# Patient Record
Sex: MALE | Race: WHITE | Employment: OTHER | URBAN - METROPOLITAN AREA
[De-identification: names, ages, dates, MRNs, and addresses within clinical notes are randomized per-mention and may not be internally consistent; named-entity substitution may affect disease eponyms.]

---

## 2017-01-19 PROCEDURE — 82043 UR ALBUMIN QUANTITATIVE: CPT | Performed by: FAMILY MEDICINE

## 2017-01-19 PROCEDURE — 82570 ASSAY OF URINE CREATININE: CPT | Performed by: FAMILY MEDICINE

## 2018-01-19 PROCEDURE — 82043 UR ALBUMIN QUANTITATIVE: CPT | Performed by: FAMILY MEDICINE

## 2018-01-19 PROCEDURE — 82570 ASSAY OF URINE CREATININE: CPT | Performed by: FAMILY MEDICINE

## 2018-01-19 PROCEDURE — 86803 HEPATITIS C AB TEST: CPT | Performed by: FAMILY MEDICINE

## 2018-08-20 PROBLEM — R91.8 PULMONARY NODULES: Status: ACTIVE | Noted: 2018-08-20

## 2019-08-23 PROCEDURE — 87186 SC STD MICRODIL/AGAR DIL: CPT | Performed by: FAMILY MEDICINE

## 2019-08-23 PROCEDURE — 87077 CULTURE AEROBIC IDENTIFY: CPT | Performed by: FAMILY MEDICINE

## 2019-08-23 PROCEDURE — 87205 SMEAR GRAM STAIN: CPT | Performed by: FAMILY MEDICINE

## 2019-08-23 PROCEDURE — 87070 CULTURE OTHR SPECIMN AEROBIC: CPT | Performed by: FAMILY MEDICINE

## 2021-01-01 ENCOUNTER — APPOINTMENT (OUTPATIENT)
Dept: CV DIAGNOSTICS | Facility: HOSPITAL | Age: 66
DRG: 280 | End: 2021-01-01
Attending: INTERNAL MEDICINE
Payer: COMMERCIAL

## 2021-01-01 ENCOUNTER — HOSPITAL ENCOUNTER (INPATIENT)
Facility: HOSPITAL | Age: 66
LOS: 2 days | Discharge: HOME | DRG: 280 | End: 2021-01-01
Attending: EMERGENCY MEDICINE | Admitting: INTERNAL MEDICINE
Payer: COMMERCIAL

## 2021-01-01 ENCOUNTER — APPOINTMENT (OUTPATIENT)
Dept: CT IMAGING | Facility: HOSPITAL | Age: 66
DRG: 280 | End: 2021-01-01
Attending: EMERGENCY MEDICINE
Payer: COMMERCIAL

## 2021-01-01 ENCOUNTER — APPOINTMENT (OUTPATIENT)
Dept: INTERVENTIONAL RADIOLOGY/VASCULAR | Facility: HOSPITAL | Age: 66
DRG: 280 | End: 2021-01-01
Attending: INTERNAL MEDICINE
Payer: COMMERCIAL

## 2021-01-01 ENCOUNTER — APPOINTMENT (OUTPATIENT)
Dept: GENERAL RADIOLOGY | Facility: HOSPITAL | Age: 66
DRG: 280 | End: 2021-01-01
Attending: EMERGENCY MEDICINE
Payer: COMMERCIAL

## 2021-01-01 ENCOUNTER — APPOINTMENT (OUTPATIENT)
Dept: GENERAL RADIOLOGY | Facility: HOSPITAL | Age: 66
DRG: 280 | End: 2021-01-01
Attending: INTERNAL MEDICINE
Payer: COMMERCIAL

## 2021-01-01 VITALS
HEIGHT: 71 IN | SYSTOLIC BLOOD PRESSURE: 110 MMHG | HEART RATE: 89 BPM | DIASTOLIC BLOOD PRESSURE: 79 MMHG | TEMPERATURE: 98 F | RESPIRATION RATE: 18 BRPM | BODY MASS INDEX: 17.64 KG/M2 | WEIGHT: 126 LBS | OXYGEN SATURATION: 97 %

## 2021-01-01 DIAGNOSIS — I50.9 ACUTE CONGESTIVE HEART FAILURE, UNSPECIFIED HEART FAILURE TYPE (HCC): Primary | ICD-10-CM

## 2021-01-01 DIAGNOSIS — I20.0 UNSTABLE ANGINA (HCC): ICD-10-CM

## 2021-01-01 DIAGNOSIS — E78.5 HYPERLIPIDEMIA, UNSPECIFIED HYPERLIPIDEMIA TYPE: ICD-10-CM

## 2021-01-01 DIAGNOSIS — R07.9 ACUTE CHEST PAIN: ICD-10-CM

## 2021-01-01 PROCEDURE — 85730 THROMBOPLASTIN TIME PARTIAL: CPT | Performed by: PHYSICIAN ASSISTANT

## 2021-01-01 PROCEDURE — 85379 FIBRIN DEGRADATION QUANT: CPT | Performed by: EMERGENCY MEDICINE

## 2021-01-01 PROCEDURE — 85027 COMPLETE CBC AUTOMATED: CPT | Performed by: INTERNAL MEDICINE

## 2021-01-01 PROCEDURE — 85025 COMPLETE CBC W/AUTO DIFF WBC: CPT | Performed by: EMERGENCY MEDICINE

## 2021-01-01 PROCEDURE — 71045 X-RAY EXAM CHEST 1 VIEW: CPT | Performed by: INTERNAL MEDICINE

## 2021-01-01 PROCEDURE — 93306 TTE W/DOPPLER COMPLETE: CPT | Performed by: INTERNAL MEDICINE

## 2021-01-01 PROCEDURE — 85027 COMPLETE CBC AUTOMATED: CPT | Performed by: PHYSICIAN ASSISTANT

## 2021-01-01 PROCEDURE — 82962 GLUCOSE BLOOD TEST: CPT

## 2021-01-01 PROCEDURE — 99152 MOD SED SAME PHYS/QHP 5/>YRS: CPT

## 2021-01-01 PROCEDURE — 84484 ASSAY OF TROPONIN QUANT: CPT | Performed by: EMERGENCY MEDICINE

## 2021-01-01 PROCEDURE — 85347 COAGULATION TIME ACTIVATED: CPT

## 2021-01-01 PROCEDURE — 99285 EMERGENCY DEPT VISIT HI MDM: CPT

## 2021-01-01 PROCEDURE — 93458 L HRT ARTERY/VENTRICLE ANGIO: CPT

## 2021-01-01 PROCEDURE — 93010 ELECTROCARDIOGRAM REPORT: CPT

## 2021-01-01 PROCEDURE — 83880 ASSAY OF NATRIURETIC PEPTIDE: CPT | Performed by: EMERGENCY MEDICINE

## 2021-01-01 PROCEDURE — 85730 THROMBOPLASTIN TIME PARTIAL: CPT | Performed by: INTERNAL MEDICINE

## 2021-01-01 PROCEDURE — 84484 ASSAY OF TROPONIN QUANT: CPT | Performed by: INTERNAL MEDICINE

## 2021-01-01 PROCEDURE — 99153 MOD SED SAME PHYS/QHP EA: CPT

## 2021-01-01 PROCEDURE — B2151ZZ FLUOROSCOPY OF LEFT HEART USING LOW OSMOLAR CONTRAST: ICD-10-PCS | Performed by: INTERNAL MEDICINE

## 2021-01-01 PROCEDURE — 71045 X-RAY EXAM CHEST 1 VIEW: CPT | Performed by: EMERGENCY MEDICINE

## 2021-01-01 PROCEDURE — B2111ZZ FLUOROSCOPY OF MULTIPLE CORONARY ARTERIES USING LOW OSMOLAR CONTRAST: ICD-10-PCS | Performed by: INTERNAL MEDICINE

## 2021-01-01 PROCEDURE — 4A023N7 MEASUREMENT OF CARDIAC SAMPLING AND PRESSURE, LEFT HEART, PERCUTANEOUS APPROACH: ICD-10-PCS | Performed by: INTERNAL MEDICINE

## 2021-01-01 PROCEDURE — 84484 ASSAY OF TROPONIN QUANT: CPT | Performed by: PHYSICIAN ASSISTANT

## 2021-01-01 PROCEDURE — 87493 C DIFF AMPLIFIED PROBE: CPT | Performed by: HOSPITALIST

## 2021-01-01 PROCEDURE — 85049 AUTOMATED PLATELET COUNT: CPT | Performed by: PHYSICIAN ASSISTANT

## 2021-01-01 PROCEDURE — 94640 AIRWAY INHALATION TREATMENT: CPT

## 2021-01-01 PROCEDURE — 96374 THER/PROPH/DIAG INJ IV PUSH: CPT

## 2021-01-01 PROCEDURE — 71260 CT THORAX DX C+: CPT | Performed by: EMERGENCY MEDICINE

## 2021-01-01 PROCEDURE — 93005 ELECTROCARDIOGRAM TRACING: CPT

## 2021-01-01 PROCEDURE — 80053 COMPREHEN METABOLIC PANEL: CPT | Performed by: INTERNAL MEDICINE

## 2021-01-01 PROCEDURE — 80053 COMPREHEN METABOLIC PANEL: CPT | Performed by: EMERGENCY MEDICINE

## 2021-01-01 RX ORDER — CLOPIDOGREL BISULFATE 75 MG/1
75 TABLET ORAL DAILY
Status: DISCONTINUED | OUTPATIENT
Start: 2021-01-01 | End: 2021-01-01

## 2021-01-01 RX ORDER — HEPARIN SODIUM AND DEXTROSE 10000; 5 [USP'U]/100ML; G/100ML
12 INJECTION INTRAVENOUS ONCE
Status: COMPLETED | OUTPATIENT
Start: 2021-01-01 | End: 2021-01-01

## 2021-01-01 RX ORDER — ALBUTEROL SULFATE 90 UG/1
2 AEROSOL, METERED RESPIRATORY (INHALATION) EVERY 6 HOURS PRN
Status: DISCONTINUED | OUTPATIENT
Start: 2021-01-01 | End: 2021-01-01

## 2021-01-01 RX ORDER — TRAZODONE HYDROCHLORIDE 50 MG/1
50 TABLET ORAL NIGHTLY
Status: DISCONTINUED | OUTPATIENT
Start: 2021-01-01 | End: 2021-01-01

## 2021-01-01 RX ORDER — METOPROLOL SUCCINATE 25 MG/1
25 TABLET, EXTENDED RELEASE ORAL
Qty: 90 TABLET | Refills: 3 | Status: SHIPPED | OUTPATIENT
Start: 2021-01-01

## 2021-01-01 RX ORDER — ASPIRIN 81 MG/1
81 TABLET ORAL DAILY
Qty: 90 TABLET | Refills: 3 | Status: SHIPPED | OUTPATIENT
Start: 2021-01-01

## 2021-01-01 RX ORDER — HEPARIN SODIUM AND DEXTROSE 10000; 5 [USP'U]/100ML; G/100ML
INJECTION INTRAVENOUS CONTINUOUS
Status: DISCONTINUED | OUTPATIENT
Start: 2021-01-01 | End: 2021-01-01

## 2021-01-01 RX ORDER — METOPROLOL TARTRATE 5 MG/5ML
INJECTION INTRAVENOUS
Status: COMPLETED
Start: 2021-01-01 | End: 2021-01-01

## 2021-01-01 RX ORDER — PANTOPRAZOLE SODIUM 40 MG/1
40 TABLET, DELAYED RELEASE ORAL
Status: DISCONTINUED | OUTPATIENT
Start: 2021-01-01 | End: 2021-01-01

## 2021-01-01 RX ORDER — ATORVASTATIN CALCIUM 40 MG/1
40 TABLET, FILM COATED ORAL DAILY
Qty: 90 TABLET | Refills: 3 | Status: SHIPPED | OUTPATIENT
Start: 2021-01-01 | End: 2021-01-01

## 2021-01-01 RX ORDER — FUROSEMIDE 10 MG/ML
40 INJECTION INTRAMUSCULAR; INTRAVENOUS ONCE
Status: COMPLETED | OUTPATIENT
Start: 2021-01-01 | End: 2021-01-01

## 2021-01-01 RX ORDER — SODIUM CHLORIDE 9 MG/ML
INJECTION, SOLUTION INTRAVENOUS CONTINUOUS
Status: ACTIVE | OUTPATIENT
Start: 2021-01-01 | End: 2021-01-01

## 2021-01-01 RX ORDER — NITROGLYCERIN 20 MG/100ML
INJECTION INTRAVENOUS
Status: COMPLETED
Start: 2021-01-01 | End: 2021-01-01

## 2021-01-01 RX ORDER — MIDAZOLAM HYDROCHLORIDE 1 MG/ML
INJECTION INTRAMUSCULAR; INTRAVENOUS
Status: COMPLETED
Start: 2021-01-01 | End: 2021-01-01

## 2021-01-01 RX ORDER — ISOSORBIDE MONONITRATE 30 MG/1
30 TABLET, EXTENDED RELEASE ORAL DAILY
Status: DISCONTINUED | OUTPATIENT
Start: 2021-01-01 | End: 2021-01-01

## 2021-01-01 RX ORDER — VERAPAMIL HYDROCHLORIDE 2.5 MG/ML
INJECTION, SOLUTION INTRAVENOUS
Status: COMPLETED
Start: 2021-01-01 | End: 2021-01-01

## 2021-01-01 RX ORDER — ISOSORBIDE MONONITRATE 30 MG/1
30 TABLET, EXTENDED RELEASE ORAL DAILY
Qty: 90 TABLET | Refills: 3 | Status: SHIPPED | OUTPATIENT
Start: 2021-01-01

## 2021-01-01 RX ORDER — LOPERAMIDE HYDROCHLORIDE 2 MG/1
2 CAPSULE ORAL
Status: DISCONTINUED | OUTPATIENT
Start: 2021-01-01 | End: 2021-01-01

## 2021-01-01 RX ORDER — DEXTROSE MONOHYDRATE 25 G/50ML
50 INJECTION, SOLUTION INTRAVENOUS
Status: DISCONTINUED | OUTPATIENT
Start: 2021-01-01 | End: 2021-01-01

## 2021-01-01 RX ORDER — ATORVASTATIN CALCIUM 20 MG/1
20 TABLET, FILM COATED ORAL NIGHTLY
Status: DISCONTINUED | OUTPATIENT
Start: 2021-01-01 | End: 2021-01-01

## 2021-01-01 RX ORDER — LOPERAMIDE HYDROCHLORIDE 2 MG/1
2 CAPSULE ORAL 4 TIMES DAILY PRN
Qty: 30 CAPSULE | Refills: 0 | Status: SHIPPED | OUTPATIENT
Start: 2021-01-01

## 2021-01-01 RX ORDER — LIDOCAINE HYDROCHLORIDE 10 MG/ML
INJECTION, SOLUTION EPIDURAL; INFILTRATION; INTRACAUDAL; PERINEURAL
Status: COMPLETED
Start: 2021-01-01 | End: 2021-01-01

## 2021-01-01 RX ORDER — HEPARIN SODIUM 5000 [USP'U]/ML
INJECTION, SOLUTION INTRAVENOUS; SUBCUTANEOUS
Status: COMPLETED
Start: 2021-01-01 | End: 2021-01-01

## 2021-01-01 RX ORDER — ATORVASTATIN CALCIUM 20 MG/1
20 TABLET, FILM COATED ORAL NIGHTLY
Status: SHIPPED | COMMUNITY
Start: 2021-01-01

## 2021-01-01 RX ORDER — ALBUTEROL SULFATE 90 UG/1
2 AEROSOL, METERED RESPIRATORY (INHALATION) EVERY 6 HOURS PRN
Qty: 1 EACH | Refills: 0 | Status: SHIPPED | OUTPATIENT
Start: 2021-01-01 | End: 2021-01-01

## 2021-01-01 RX ORDER — BUPROPION HYDROCHLORIDE 150 MG/1
150 TABLET, EXTENDED RELEASE ORAL 2 TIMES DAILY
Status: DISCONTINUED | OUTPATIENT
Start: 2021-01-01 | End: 2021-01-01

## 2021-01-01 RX ORDER — ASPIRIN 81 MG/1
81 TABLET ORAL DAILY
Status: DISCONTINUED | OUTPATIENT
Start: 2021-01-01 | End: 2021-01-01

## 2021-01-01 RX ORDER — METOPROLOL SUCCINATE 25 MG/1
25 TABLET, EXTENDED RELEASE ORAL
Status: DISCONTINUED | OUTPATIENT
Start: 2021-01-01 | End: 2021-01-01

## 2021-08-28 PROBLEM — C78.02 MALIGNANT NEOPLASM METASTATIC TO LEFT LUNG (HCC): Status: ACTIVE | Noted: 2021-01-01

## 2021-08-28 PROBLEM — C78.01 MALIGNANT NEOPLASM METASTATIC TO RIGHT LUNG (HCC): Status: ACTIVE | Noted: 2021-01-01

## 2021-08-28 PROBLEM — C78.6 METASTASIS TO PERITONEAL CAVITY (HCC): Status: ACTIVE | Noted: 2021-01-01

## 2021-08-28 PROBLEM — C78.7 LIVER METASTASIS (HCC): Status: ACTIVE | Noted: 2021-01-01

## 2021-08-28 PROBLEM — D50.0 IRON DEFICIENCY ANEMIA DUE TO CHRONIC BLOOD LOSS: Status: ACTIVE | Noted: 2021-01-01

## 2021-08-28 PROBLEM — C18.7 MALIGNANT NEOPLASM OF SIGMOID COLON (HCC): Status: ACTIVE | Noted: 2021-01-01

## 2021-10-20 PROBLEM — I50.9 ACUTE CONGESTIVE HEART FAILURE, UNSPECIFIED HEART FAILURE TYPE (HCC): Status: ACTIVE | Noted: 2021-01-01

## 2021-10-20 PROBLEM — I50.9 ACUTE CONGESTIVE HEART FAILURE (HCC): Status: ACTIVE | Noted: 2021-01-01

## 2021-10-20 PROBLEM — R07.9 ACUTE CHEST PAIN: Status: ACTIVE | Noted: 2021-01-01

## 2021-10-20 NOTE — ED PROVIDER NOTES
Patient Seen in: [facility] Emergency Department      History   Patient presents with:  Chest Pain Angina    Stated Complaint: CP    Subjective:   e          Patient 66-year-old male with history of coronary disease, metastatic colon cancer undergoi Years: 35.00        Pack years: 28        Types: Cigarettes      Smokeless tobacco: Never Used    Alcohol use: No      Alcohol/week: 0.0 standard drinks    Drug use:  No             Review of Systems    Positive for stated complaint: CP  Other systems are a ED Course     Labs Reviewed   COMP METABOLIC PANEL (14) - Abnormal; Notable for the following components:       Result Value    Glucose 138 (*)     BUN 36 (*)     Calcium, Total 6.6 (*)     Calculated Osmolality 299 (*)     AST 98 (*)     Ene myself. -Imaging studies were ordered and reviewed by myself.   -If available, previous medical record was reviewed for the patient to obtain additional history.  -Nursing and tech notes reviewed and data confirmed.    -Tracing on cardiac monitor and pulse BLUE   RAINBOW DRAW LAVENDER   RAINBOW DRAW LIGHT GREEN   RAINBOW DRAW GOLD     CT CHEST PE AORTA (IV ONLY) (CPT=71260)   Final Result    PROCEDURE:  CT CHEST PE AORTA (IV ONLY) (CPT=71260)         COMPARISON:  [facility] , XR, XR CHEST AP PORTABLE  (CPT=71070 evaluation and to assess stability of the above findings. 3. Mild ascites.               Dictated by (CST): Genna Mayberry MD on 10/20/2021 at 2:13 PM         Finalized by (CST): Genna Mayberry MD on 10/20/2021 at 2:24 PM        XR CHEST AP PORTABLE  (CPT=71 Laboratory work-up reviewed. Patient was given IV Lasix for elevated BNP suggestive of heart failure. Chest CT negative for PE but he does have numerous meds. Troponins negative. EKG does show left bundle branch block tachycardic in the 130s.     Pa

## 2021-10-20 NOTE — H&P
[facility] Hospitalist History and Physical      CC: chest discomfort, LH    PCP: Bebeto Mccabe MD    History of Present Illness: Patient is a 77year old male with PMH sig for metastatic colon CA currently undergoing chemotherapy with FOLFOX/Avastin, CAD with h breakfast., Disp: 90 tablet, Rfl: 3  Multiple Vitamins-Minerals (ONE-A-DAY MENS 50+ ADVANTAGE) Oral Tab, Take by mouth., Disp: , Rfl:   metFORMIN HCl 1000 MG Oral Tab, Take 1 tablet (1,000 mg total) by mouth 2 (two) times daily with meals. , Disp: 180 table otherwise noted in HPI    Objective:  /78 (BP Location: Left arm)   Pulse (!) 126   Resp 21   Ht 5' 11\" (1.803 m)   Wt 126 lb (57.2 kg)   SpO2 98%   BMI 17.57 kg/m²     Gen: No acute distress, thin  Eyes: Pink conjunctivae, No ptosis  Neck: No molina Meds per cardiology    # Hypotension  - By hx sounds to be 2/2 volume depletion from daily diarrhea- he reports feeling dizzy/LH with standing since starting chemo   - No s/s of acute infection  - Afebrile, WBC OK  - BP improving with IVF since admit- cont

## 2021-10-20 NOTE — PROCEDURES
OPERATIVE REPORT - DIAGNOSTIC CARDIAC CATHETERIZATION    Date of Procedure: 10/20/2021     Performing Physician: Annie Powell. Tre Hopkins MD    Pre-Procedure Diagnosis:   1. Multivessel CAD s/p LAD stents 2015  2. Known RCA   3. Systolic heart failure  4.  Metas known CAD with LAD stents in 2015, and had NSTEMI with troponin ~ 2.4 2 weeks ago. Referred for left heart catheterization with coronary angiography to evaluate for obstructive CAD.      Description of Procedure: Informed consent was obtained from the patie projections. The RCA was then engaged and standard angiographic views were performed. The catheter was then exchanged for a 6F pigtail, then prolapsed into the LV over a wire and placed at the base of the LV.  LV systolic and end diastolic pressure was then

## 2021-10-20 NOTE — PLAN OF CARE
Received 15:30 from cath lab. Right radial TR band in place. Removed 2 ml per protocol. Placed dressing on at 18:45 site dry and intact no hematoma or bleeding. Patient states has frequent diarrhea due to colon cancer. Placed on C-dif protocol.   Discu

## 2021-10-21 NOTE — PAYOR COMM NOTE
--------------  ADMISSION REVIEW     Payor: [employer] #:  [id number]  Authorization Number: [id number]    Admit date: 10/20/21  Admit time:  3:33 PM     History   Patient presents with:  Chest Pain Angina    Stated Complaint: CP    Patient 66-ye 1039]   /81   Pulse (!) 138   Resp 18   Temp    Temp src    SpO2 100 %   O2 Device None (Room air)       Current:/89   Pulse (!) 135   Resp 18   Ht 180.3 cm (5' 11\")   Wt 57.2 kg   SpO2 100%   BMI 17.57 kg/m²     Physical Exam  Vitals and nurs A/G Ratio 0.4 (*)     All other components within normal limits   D-DIMER - Abnormal; Notable for the following components:    D-Dimer 3.81 (*)     All other components within normal limits   PRO BETA NATRIURETIC PEPTIDE - Abnormal; Notable for the fol If patient has prior studies, these would be helpful for further     evaluation and to assess stability of the above findings. 3. Mild ascites.           XR CHEST AP PORTABLE  (CPT=71045)   Final Result    PROCEDURE:  XR CHEST AP PORTABLE  (CPT=7104 failure, unspecified heart failure type ([facility] [address]  (primary encounter diagnosis)  Acute chest pain  Unstable angina ([facility] [address]     Disposition:  Admit  10/20/2021  2:40 pm    History of Present Illness: Patient is a 77year old male with PMH sig for metastatic colo (COMPAZINE) 10 mg tablet, Take 1 tablet (10 mg total) by mouth every 6 (six) hours as needed for Nausea.  Every 6 hours prn nausea or as instructed by physician, Disp: 30 tablet, Rfl: 3  ondansetron (ZOFRAN) 8 MG tablet, Take 1 tablet (8 mg total) by mouth unspecified heart failure type ([facility] [address]  Active Problems:    Acute congestive heart failure (HCC)    Acute chest pain    # Chest pain  # Known CAD  - SP Angio- plan for medical management of CAD  - CTA neg for PE  - No PNA on imaging, chronic SOB 2/2 COPD he iohexol (OMNIPAQUE) 350 MG/ML injection 80 mL     Date Action Dose Route User    10/20/2021 1348 Given 80 mL Intravenous Estel Mode, Therese      isosorbide mononitrate ER (IMDUR) 24 hr tab 30 mg     Date Action Dose Route User    10/21/2021 0946 Given 30 mg Or °F (36.8 °C) 120 17 113/86 98 % — None (Room air) — TAQUERIA    10/21/21 0300 — 117 21 126/87 94 % — — — TAQUERIA    10/21/21 0200 — 104 23 112/79 92 % — — — TAQUERIA    10/21/21 0100 — 112 23 113/78 96 % — — — TAQUERIA    10/21/21 0000 97.9 °F (36.6 °C) 94 20 113/82 98 % — None collaterals from LAD septal perforators. 5. LV: LVEF 25-30%, inferior wall and apex akinetic,anterolateral mild hypokinesis, 1-2+ MR, LVEDP 10-12 mmHg. No aortic stenosis on pullback.    6. Systemic pressure 70s/40s, improved to 80-90s with 250cc NS bolus

## 2021-10-21 NOTE — PROGRESS NOTES
Assumed care of pt. At [address]. Pt. Resting in bed. A & O x4. VSS. LUCIANO. R wrist s/p TR band C/D/I, soft. Denies of any pain. Ambulating as tolerated. Frequent BMs, stool sample sent. Plan for ECHO in AM. Will continue to monitor closely. 0400 - pt.  Boris Palomo

## 2021-10-21 NOTE — PLAN OF CARE
Received patient around 0730 up in chair. ECHO done. SR/ST on monitors with occ PVCs. Weaned to RA. Right wrist puncture site without hematoma. Having loose stools, incontinent at times. Wife at bedside.  Pt and wife emotional talking about cancer prognosis

## 2021-10-21 NOTE — PROGRESS NOTES
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 Will try medical therapy for now.  If angina not controlled, can bring back for PCI to OM1.   - start Toprol and Imdur  - Continue ASA and plavix, statin, watch mild LFT elevations

## 2021-10-21 NOTE — CONSULTS
HPI: Omid Wilson is a 77year old male with a history of metastatic colon cancer (mets to liver, lung), HTN, GERD, CAD s/p PCI, CHF, and recent NSTEMI (10/7/21, [facility]) who presented to his cardiologist's office yesterday with c/o chest pain.  He (40 mg total) by mouth every morning before breakfast.   Prochlorperazine Maleate (COMPAZINE) 10 mg tablet   No No   Sig: Take 1 tablet (10 mg total) by mouth every 6 (six) hours as needed for Nausea.  Every 6 hours prn nausea or as instructed by physician Years of education: Not on file      Highest education level: Not on file    Occupational History      Not on file    Tobacco Use      Smoking status: Current Every Day Smoker        Packs/day: 1.00        Years: 35.00        Pack years: 35        Types: C had MI   • Hypertension Mother        REVIEW OF SYSTEMS      10 pt ROS negative except what is mentioned in HPI    OBJECTIVE       10/21/21  0800 10/21/21  0830 10/21/21  0900 10/21/21  1044   BP: 106/80  112/78 109/75   BP Location: Left arm   Left arm exacerbation  Metastatic colon cancer - with mets to liver, lungs.  On FOLFOX chemotherapy  -per oncology  Dispo   -full code  -palliative care would be of benefit to this patient with advanced colon cancer    We will continue to follow with you       Matth

## 2021-10-21 NOTE — PROGRESS NOTES
[facility] Hospitalist Progress Note                                                                   [address]  4/28/1955    CC: FU CAD    Interval History:  - Had some SOB last evening, 70   CA 6.6* 6.3*    139   K 3.9 4.1    105   CO2 27.0 28.0           ROS: no change to ROS from documentation yesterday, except as otherwise noted in the Interval History above.     Assessment/Plan:    # Chest pain  # Known CAD  - SP Angio- jakob

## 2021-10-21 NOTE — DIETARY MALNUTRITION NOTE
[facility]    NUTRITION ASSESSMENT    Pt meets severe malnutrition criteria.     CRITERIA FOR MALNUTRITION DIAGNOSIS:  Criteria for severe malnutrition diagnosis: chronic illness related to wt loss greater than 10% in 6 months, energy intake less than (126 lb)       Wt Readings from Last 10 Encounters:  10/20/21 : 57.2 kg (126 lb)  10/20/21 : 57.2 kg (126 lb)  10/11/21 : 56.8 kg (125 lb 3.2 oz)  10/07/21 : 59.9 kg (132 lb)  10/04/21 : 60.5 kg (133 lb 6.4 oz)  09/20/21 : 55.9 kg (123 lb 3.2 oz)  09/17/21 at High nutrition risk    FOLLOW-UP DATE: 10/25    Qian Vaca RDN  Clinical Dietitian  Pager- [phone] Zgvnbwf- [phone]

## 2021-10-21 NOTE — PLAN OF CARE
Pt A&Ox4. Denies pain at this time. NSR/ST on tele. Lungs dim with inspiratory wheeze. Pt has productive clear cough as well. Tolerating RA. Pt has some dyspnea with exertion. Spo2 stable. Pt has recent weight loss from chemo. Pt is pale.  Port accessed to

## 2021-10-22 NOTE — PROGRESS NOTES
[address] [facility] Patient Status:  Inpatient    1955 MRN [id number]   [facility] 8NE-A Attending Cyndy Lopez MD   Hosp Day # 2 PCP Andres Saxena MD     SUBJECTIVE: doing well. On room air.  Denies cough/wh Intravenous, PRN  •  pantoprazole (PROTONIX) EC tab 40 mg, 40 mg, Oral, QAM AC     General appearance: alert, appears stated age and cooperative  Lungs: clear to auscultation bilaterally  Heart: S1, S2 normal, no murmur, click, rub or gallop, regular rate

## 2021-10-22 NOTE — PLAN OF CARE
Alert and oriented x4. On 2L O2, sats 94%. Denies any SOB or chest pain. ST on tele. Continent to bowel and bladder, chronic diarrhea relieved by immodium. Denies pain. Up with SBA. Call light within reach. Family to stay at bedside. Updated on POC.

## 2021-10-22 NOTE — PAYOR COMM NOTE
Discharge Notification    Patient Name: Darlin Choi  Payor: [employer] #: [id number]  Authorization Number: [id number]  Admit Date/Time: 10/20/2021 10:35 AM  Discharge Date/Time: 10/22/2021 10:08 AM

## 2021-10-22 NOTE — PLAN OF CARE
NURSING DISCHARGE NOTE    Discharged Home via Wheelchair. Accompanied by Support staff  Belongings Taken by patient/family. Pt insistent about leaving this AM. Consults called and seen patient. AVS completed.  Discharge instructions reviewed with pa

## 2021-10-22 NOTE — DISCHARGE SUMMARY
General Medicine Discharge Summary     Patient ID:  Renetta Ruvalcaba  77year old  4/28/1955    Admit date: 10/20/2021    Discharge date and time:  10/22/21    Attending Physician: No att. providers fo chemo   - No s/s of acute infection  - Afebrile, WBC OK  - Improved with IVF overnight  - Monitor BP today- stable this Am     # Metastatic colon CA  - Onc notes reviewed, on chemo as outpatient, recently started  - Very bothered by diarrhea see below    CHANGED    atorvastatin 20 MG Oral Tab  Take 1 tablet (20 mg total) by mouth nightly., Historical      CONTINUE these medications which have NOT CHANGED    TRAZODONE 50 MG Oral Tab  Take 1 tablet (50 mg total) by mouth nightly., Normal, Disp-30 tablet, R-0

## 2021-10-22 NOTE — PROGRESS NOTES
[facility]  Cardiology Progress Note    Erick Cordova Patient Status:  Inpatient    1955 MRN [id number]   [facility] 6NE-A Attending Adriane Burroughs MD   [facility] Day # 2 PCP Ashley Whiteside MD       Subjective: SBP 110s now Diabetic nephropathy associated with type 2 diabetes mellitus ([facility] [address]     Pulmonary nodules     Iron deficiency anemia due to chronic blood loss     Malignant neoplasm metastatic to right lung [facility])     Malignant neoplasm of sigmoid colon ([facility] [address]     Liver m 44   AST 98* 95*   ALB 1.8* 1.7*       Recent Labs   Lab 10/20/21  1101 10/21/21  0915 10/21/21  1807 10/22/21  0445   TROP <0.045 0.677* 0.933* 0.585*     Diagnostics:    Tele: NSR    ASSESSMENT AND PLAN:     # Multivessel CAD h/o NSTEMI s/p LAD stents 20 patient was personally seen and examined by me. I agree with the note written below with the following comments:    Yesterday troponin came back mildly positive, thus his presentation was NSTEMI.  He was started on heparin drip and troponin is trending down

## 2021-10-29 NOTE — CDS QUERY
Potential for Impaired Nutritional Status  Kami Cavanaugh    Dear Dr. Paulo Cardenas:  Clinical information as documented below suggests potential for impaired nutritional status.  For accurate ICD-10-CM code assignment to reflect severity of illn regarding this query, please contact Clinical Documentation :   Melanie Mercedes  [address] x [address], Cell# [phone]                          Thank you!                                                            THIS FORM IS A PERMANENT PAR